# Patient Record
Sex: MALE | Race: WHITE | NOT HISPANIC OR LATINO | URBAN - METROPOLITAN AREA
[De-identification: names, ages, dates, MRNs, and addresses within clinical notes are randomized per-mention and may not be internally consistent; named-entity substitution may affect disease eponyms.]

---

## 2017-03-16 ENCOUNTER — IMPORTED ENCOUNTER (OUTPATIENT)
Dept: URBAN - METROPOLITAN AREA CLINIC 50 | Facility: CLINIC | Age: 77
End: 2017-03-16

## 2017-03-16 NOTE — PATIENT DISCUSSION
"""Dr. Denise Esparza to review test results with patient. "" ""Reassured patient that intraocular pressures (IOPs) are at target levels and other ocular findings are stable. Continue present management and/or medication(s).  Follow up as directed. """

## 2017-09-28 ENCOUNTER — IMPORTED ENCOUNTER (OUTPATIENT)
Dept: URBAN - METROPOLITAN AREA CLINIC 50 | Facility: CLINIC | Age: 77
End: 2017-09-28

## 2017-10-05 ENCOUNTER — IMPORTED ENCOUNTER (OUTPATIENT)
Dept: URBAN - METROPOLITAN AREA CLINIC 50 | Facility: CLINIC | Age: 77
End: 2017-10-05

## 2017-10-12 ENCOUNTER — IMPORTED ENCOUNTER (OUTPATIENT)
Dept: URBAN - METROPOLITAN AREA CLINIC 50 | Facility: CLINIC | Age: 77
End: 2017-10-12

## 2017-11-02 ENCOUNTER — IMPORTED ENCOUNTER (OUTPATIENT)
Dept: URBAN - METROPOLITAN AREA CLINIC 50 | Facility: CLINIC | Age: 77
End: 2017-11-02

## 2018-03-29 ENCOUNTER — IMPORTED ENCOUNTER (OUTPATIENT)
Dept: URBAN - METROPOLITAN AREA CLINIC 50 | Facility: CLINIC | Age: 78
End: 2018-03-29

## 2018-03-29 NOTE — PATIENT DISCUSSION
""" to review testing results with patient. IOP too high. Target range is 14 to 16. "" ""Start Combigan right eye twice a day. sample given"" ""Start Lumigan right eye at bedtime. sample given"" ""Continue Timolol 0.5% left eye twice a day.  """

## 2018-04-17 ENCOUNTER — IMPORTED ENCOUNTER (OUTPATIENT)
Dept: URBAN - METROPOLITAN AREA CLINIC 50 | Facility: CLINIC | Age: 78
End: 2018-04-17

## 2018-04-18 ENCOUNTER — IMPORTED ENCOUNTER (OUTPATIENT)
Dept: URBAN - METROPOLITAN AREA CLINIC 50 | Facility: CLINIC | Age: 78
End: 2018-04-18

## 2018-06-07 ENCOUNTER — IMPORTED ENCOUNTER (OUTPATIENT)
Dept: URBAN - METROPOLITAN AREA CLINIC 50 | Facility: CLINIC | Age: 78
End: 2018-06-07

## 2018-07-24 ENCOUNTER — IMPORTED ENCOUNTER (OUTPATIENT)
Dept: URBAN - METROPOLITAN AREA CLINIC 50 | Facility: CLINIC | Age: 78
End: 2018-07-24

## 2018-07-24 NOTE — PATIENT DISCUSSION
"""Continue Combigan right eye twice a day.  sample given"" ""Continue Lumigan right eye at bedtime. """

## 2018-07-24 NOTE — PATIENT DISCUSSION
"""Start Ilevro right eye twice a day. sample given"" ""Continue Artificial tears both eyes two - four times a day.  """

## 2018-10-04 ENCOUNTER — IMPORTED ENCOUNTER (OUTPATIENT)
Dept: URBAN - METROPOLITAN AREA CLINIC 50 | Facility: CLINIC | Age: 78
End: 2018-10-04

## 2018-10-04 NOTE — PATIENT DISCUSSION
"""Continue Timolol 0.5% left eye twice a day ."" ""Reassured patient that intraocular pressures (IOPs) are at target levels and other ocular findings are stable. Continue present management and/or medication(s).  Follow up as directed. """

## 2018-10-04 NOTE — PATIENT DISCUSSION
"""Refer to Dr. 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 ""

## 2018-10-05 ENCOUNTER — IMPORTED ENCOUNTER (OUTPATIENT)
Dept: URBAN - METROPOLITAN AREA CLINIC 50 | Facility: CLINIC | Age: 78
End: 2018-10-05

## 2018-10-18 ENCOUNTER — IMPORTED ENCOUNTER (OUTPATIENT)
Dept: URBAN - METROPOLITAN AREA CLINIC 50 | Facility: CLINIC | Age: 78
End: 2018-10-18

## 2018-10-23 ENCOUNTER — IMPORTED ENCOUNTER (OUTPATIENT)
Dept: URBAN - METROPOLITAN AREA CLINIC 50 | Facility: CLINIC | Age: 78
End: 2018-10-23

## 2018-11-01 ENCOUNTER — IMPORTED ENCOUNTER (OUTPATIENT)
Dept: URBAN - METROPOLITAN AREA CLINIC 50 | Facility: CLINIC | Age: 78
End: 2018-11-01

## 2018-11-12 ENCOUNTER — IMPORTED ENCOUNTER (OUTPATIENT)
Dept: URBAN - METROPOLITAN AREA CLINIC 50 | Facility: CLINIC | Age: 78
End: 2018-11-12

## 2018-11-26 ENCOUNTER — IMPORTED ENCOUNTER (OUTPATIENT)
Dept: URBAN - METROPOLITAN AREA CLINIC 50 | Facility: CLINIC | Age: 78
End: 2018-11-26

## 2018-12-17 ENCOUNTER — IMPORTED ENCOUNTER (OUTPATIENT)
Dept: URBAN - METROPOLITAN AREA CLINIC 50 | Facility: CLINIC | Age: 78
End: 2018-12-17

## 2018-12-26 ENCOUNTER — IMPORTED ENCOUNTER (OUTPATIENT)
Dept: URBAN - METROPOLITAN AREA CLINIC 50 | Facility: CLINIC | Age: 78
End: 2018-12-26

## 2018-12-27 ENCOUNTER — IMPORTED ENCOUNTER (OUTPATIENT)
Dept: URBAN - METROPOLITAN AREA CLINIC 50 | Facility: CLINIC | Age: 78
End: 2018-12-27

## 2018-12-31 ENCOUNTER — IMPORTED ENCOUNTER (OUTPATIENT)
Dept: URBAN - METROPOLITAN AREA CLINIC 50 | Facility: CLINIC | Age: 78
End: 2018-12-31

## 2019-02-12 ENCOUNTER — IMPORTED ENCOUNTER (OUTPATIENT)
Dept: URBAN - METROPOLITAN AREA CLINIC 50 | Facility: CLINIC | Age: 79
End: 2019-02-12

## 2019-02-13 ENCOUNTER — IMPORTED ENCOUNTER (OUTPATIENT)
Dept: URBAN - METROPOLITAN AREA CLINIC 50 | Facility: CLINIC | Age: 79
End: 2019-02-13

## 2019-02-19 ENCOUNTER — IMPORTED ENCOUNTER (OUTPATIENT)
Dept: URBAN - METROPOLITAN AREA CLINIC 50 | Facility: CLINIC | Age: 79
End: 2019-02-19

## 2019-03-04 ENCOUNTER — IMPORTED ENCOUNTER (OUTPATIENT)
Dept: URBAN - METROPOLITAN AREA CLINIC 50 | Facility: CLINIC | Age: 79
End: 2019-03-04

## 2019-03-05 ENCOUNTER — IMPORTED ENCOUNTER (OUTPATIENT)
Dept: URBAN - METROPOLITAN AREA CLINIC 50 | Facility: CLINIC | Age: 79
End: 2019-03-05

## 2019-03-15 ENCOUNTER — IMPORTED ENCOUNTER (OUTPATIENT)
Dept: URBAN - METROPOLITAN AREA CLINIC 50 | Facility: CLINIC | Age: 79
End: 2019-03-15

## 2019-04-09 ENCOUNTER — IMPORTED ENCOUNTER (OUTPATIENT)
Dept: URBAN - METROPOLITAN AREA CLINIC 50 | Facility: CLINIC | Age: 79
End: 2019-04-09

## 2019-04-11 ENCOUNTER — IMPORTED ENCOUNTER (OUTPATIENT)
Dept: URBAN - METROPOLITAN AREA CLINIC 50 | Facility: CLINIC | Age: 79
End: 2019-04-11

## 2019-04-25 ENCOUNTER — IMPORTED ENCOUNTER (OUTPATIENT)
Dept: URBAN - METROPOLITAN AREA CLINIC 50 | Facility: CLINIC | Age: 79
End: 2019-04-25

## 2019-08-22 ENCOUNTER — IMPORTED ENCOUNTER (OUTPATIENT)
Dept: URBAN - METROPOLITAN AREA CLINIC 50 | Facility: CLINIC | Age: 79
End: 2019-08-22

## 2019-10-24 ENCOUNTER — IMPORTED ENCOUNTER (OUTPATIENT)
Dept: URBAN - METROPOLITAN AREA CLINIC 50 | Facility: CLINIC | Age: 79
End: 2019-10-24

## 2020-03-09 ENCOUNTER — IMPORTED ENCOUNTER (OUTPATIENT)
Dept: URBAN - METROPOLITAN AREA CLINIC 50 | Facility: CLINIC | Age: 80
End: 2020-03-09

## 2020-04-30 ENCOUNTER — IMPORTED ENCOUNTER (OUTPATIENT)
Dept: URBAN - METROPOLITAN AREA CLINIC 50 | Facility: CLINIC | Age: 80
End: 2020-04-30

## 2020-05-06 NOTE — PATIENT DISCUSSION
NAMITA OU:  PRESCRIBED UV PROTECTION TO SLOW GROWTH. PRESCRIBE ARTIFICAL TEARS TO INCREASE COMFORT. Detail Level: Simple Instructions: This plan will send the code FBSD to the PM system.  DO NOT or CHANGE the price. Price (Do Not Change): 0.00

## 2020-07-27 ENCOUNTER — IMPORTED ENCOUNTER (OUTPATIENT)
Dept: URBAN - METROPOLITAN AREA CLINIC 50 | Facility: CLINIC | Age: 80
End: 2020-07-27

## 2020-10-29 ENCOUNTER — IMPORTED ENCOUNTER (OUTPATIENT)
Dept: URBAN - METROPOLITAN AREA CLINIC 50 | Facility: CLINIC | Age: 80
End: 2020-10-29

## 2020-11-11 ENCOUNTER — IMPORTED ENCOUNTER (OUTPATIENT)
Dept: URBAN - METROPOLITAN AREA CLINIC 50 | Facility: CLINIC | Age: 80
End: 2020-11-11

## 2020-11-18 ENCOUNTER — IMPORTED ENCOUNTER (OUTPATIENT)
Dept: URBAN - METROPOLITAN AREA CLINIC 50 | Facility: CLINIC | Age: 80
End: 2020-11-18

## 2020-11-30 ENCOUNTER — IMPORTED ENCOUNTER (OUTPATIENT)
Dept: URBAN - METROPOLITAN AREA CLINIC 50 | Facility: CLINIC | Age: 80
End: 2020-11-30

## 2020-12-01 ENCOUNTER — IMPORTED ENCOUNTER (OUTPATIENT)
Dept: URBAN - METROPOLITAN AREA CLINIC 50 | Facility: CLINIC | Age: 80
End: 2020-12-01

## 2020-12-03 NOTE — PATIENT DISCUSSION
RE-EVAL DR ARCEO/ADRIEL 2-3 WKS FOR IOP AND EDEMA - IF NO IMPROVEMENT REEVAL DR PACK OTHERWISE CONTINUE AND TRY AND TAPER ONCE EDMEA CLEARS.

## 2020-12-09 ENCOUNTER — IMPORTED ENCOUNTER (OUTPATIENT)
Dept: URBAN - METROPOLITAN AREA CLINIC 50 | Facility: CLINIC | Age: 80
End: 2020-12-09

## 2021-01-06 ENCOUNTER — IMPORTED ENCOUNTER (OUTPATIENT)
Dept: URBAN - METROPOLITAN AREA CLINIC 50 | Facility: CLINIC | Age: 81
End: 2021-01-06

## 2021-03-11 ENCOUNTER — IMPORTED ENCOUNTER (OUTPATIENT)
Dept: URBAN - METROPOLITAN AREA CLINIC 50 | Facility: CLINIC | Age: 81
End: 2021-03-11

## 2021-04-18 ASSESSMENT — VISUAL ACUITY
OS_SC: 20/20
OS_SC: 20/25
OS_CC: J1+
OD_SC: 20/50
OS_SC: 20/25
OD_SC: 20/50
OD_SC: 20/25-3
OS_SC: 20/25-2
OS_SC: 20/20-
OS_SC: 20/20
OD_SC: 20/30-+
OS_CC: J1+
OD_SC: 20/80
OD_CC: J1+
OD_PH: 20/25-1
OS_SC: 20/20
OD_PH: 20/30-1
OD_SC: 20/25-
OD_SC: 20/30-
OD_PH: 20/30+
OS_SC: 20/20-1
OS_SC: 20/25-1
OD_CC: J1+
OS_SC: 20/20
OS_SC: 20/20
OS_CC: J1+
OD_PH: 20/50+2
OS_SC: 20/25
OD_SC: 20/25-2
OD_SC: 20/40
OD_SC: 20/60+2
OS_CC: J1+
OD_CC: J1+
OD_SC: 20/50
OS_SC: 20/25-1
OD_SC: 20/30+2
OS_SC: 20/20
OD_SC: 20/25-
OD_SC: 20/60
OD_PH: 20/25
OS_SC: 20/25-
OS_SC: 20/30-1
OS_SC: 20/20
OD_SC: 20/25-
OD_SC: 20/30
OD_SC: 20/30+
OS_SC: 20/20
OD_SC: 20/20
OS_SC: 20/20
OS_SC: 20/25
OS_SC: 20/25
OS_SC: 20/20
OD_SC: 20/30-
OS_SC: 20/20
OD_SC: 20/25-1
OD_SC: 20/25-2
OS_SC: 20/30-2
OD_SC: 20/30+1
OD_SC: 20/40
OD_PH: 20/40
OD_SC: 20/25-2
OD_SC: 20/25
OS_SC: 20/20
OD_SC: 20/50
OD_PH: 20/40
OS_SC: 20/20-1
OD_SC: 20/30-1
OS_SC: 20/20-
OD_CC: J1+
OD_PH: 20/30
OS_SC: 20/20
OS_SC: 20/20-
OD_PH: 20/30
OD_CC: J1+
OS_CC: J1+
OD_PH: 20/25
OD_PH: 20/30+
OD_SC: 20/40+3
OD_SC: 20/50

## 2021-04-18 ASSESSMENT — PACHYMETRY
OD_CT_UM: 540
OS_CT_UM: 543
OD_CT_UM: 540
OD_CT_UM: 540
OS_CT_UM: 543
OD_CT_UM: 540
OS_CT_UM: 543
OS_CT_UM: 553
OS_CT_UM: 553
OS_CT_UM: 543
OD_CT_UM: 540
OS_CT_UM: 543
OD_CT_UM: 540
OD_CT_UM: 552
OD_CT_UM: 540
OD_CT_UM: 552
OS_CT_UM: 543
OD_CT_UM: 540
OS_CT_UM: 553
OS_CT_UM: 543
OD_CT_UM: 540
OS_CT_UM: 543
OD_CT_UM: 540
OS_CT_UM: 543
OS_CT_UM: 553
OS_CT_UM: 543
OS_CT_UM: 543
OD_CT_UM: 540
OD_CT_UM: 552
OS_CT_UM: 543
OD_CT_UM: 540
OS_CT_UM: 543
OD_CT_UM: 552
OD_CT_UM: 540
OD_CT_UM: 552
OS_CT_UM: 553
OD_CT_UM: 540
OD_CT_UM: 540
OD_CT_UM: 552
OS_CT_UM: 553
OS_CT_UM: 543
OD_CT_UM: 540
OD_CT_UM: 540
OS_CT_UM: 543
OS_CT_UM: 543
OD_CT_UM: 552
OD_CT_UM: 540
OD_CT_UM: 552
OS_CT_UM: 553
OS_CT_UM: 543
OD_CT_UM: 552
OD_CT_UM: 552
OS_CT_UM: 543
OS_CT_UM: 543
OD_CT_UM: 540
OS_CT_UM: 543
OS_CT_UM: 553
OD_CT_UM: 540
OD_CT_UM: 540
OS_CT_UM: 543
OS_CT_UM: 553
OD_CT_UM: 540
OD_CT_UM: 552
OS_CT_UM: 553
OS_CT_UM: 553
OD_CT_UM: 540

## 2021-04-18 ASSESSMENT — TONOMETRY
OD_IOP_MMHG: 17
OS_IOP_MMHG: 22
OS_IOP_MMHG: 16
OS_IOP_MMHG: 11
OS_IOP_MMHG: 14
OS_IOP_MMHG: 20
OS_IOP_MMHG: 19
OD_IOP_MMHG: 14
OD_IOP_MMHG: 15
OD_IOP_MMHG: 24
OS_IOP_MMHG: 15
OS_IOP_MMHG: 15
OD_IOP_MMHG: 15
OS_IOP_MMHG: 15
OD_IOP_MMHG: 08
OS_IOP_MMHG: 16
OD_IOP_MMHG: 17
OD_IOP_MMHG: 19
OD_IOP_MMHG: 12
OS_IOP_MMHG: 16
OS_IOP_MMHG: 15
OS_IOP_MMHG: 15
OS_IOP_MMHG: 16
OD_IOP_MMHG: 14
OD_IOP_MMHG: 13
OD_IOP_MMHG: 20
OS_IOP_MMHG: 20
OS_IOP_MMHG: 18
OS_IOP_MMHG: 14
OS_IOP_MMHG: 15
OS_IOP_MMHG: 22
OD_IOP_MMHG: 14
OS_IOP_MMHG: 18
OS_IOP_MMHG: 16
OS_IOP_MMHG: 13
OD_IOP_MMHG: 10
OS_IOP_MMHG: 18
OD_IOP_MMHG: 15
OD_IOP_MMHG: 11
OS_IOP_MMHG: 14
OD_IOP_MMHG: 14
OS_IOP_MMHG: 18
OS_IOP_MMHG: 21
OS_IOP_MMHG: 13
OD_IOP_MMHG: 16
OD_IOP_MMHG: 14
OS_IOP_MMHG: 15
OS_IOP_MMHG: 13
OD_IOP_MMHG: 18
OD_IOP_MMHG: 8
OD_IOP_MMHG: 18
OD_IOP_MMHG: 14
OS_IOP_MMHG: 14
OD_IOP_MMHG: 14
OS_IOP_MMHG: 14
OD_IOP_MMHG: 18
OD_IOP_MMHG: 10
OD_IOP_MMHG: 13
OD_IOP_MMHG: 13
OD_IOP_MMHG: 16
OD_IOP_MMHG: 18
OS_IOP_MMHG: 14
OD_IOP_MMHG: 16
OD_IOP_MMHG: 11

## 2021-07-07 ENCOUNTER — PREPPED CHART (OUTPATIENT)
Dept: URBAN - METROPOLITAN AREA CLINIC 49 | Facility: CLINIC | Age: 81
End: 2021-07-07

## 2021-07-07 NOTE — PATIENT DISCUSSION
Severe Primary Open Angle Glaucoma, bilateral. IOPs stable. Continue Combigan both eyes twice a day.

## 2021-07-08 ENCOUNTER — FOLLOW UP (OUTPATIENT)
Dept: URBAN - METROPOLITAN AREA CLINIC 49 | Facility: CLINIC | Age: 81
End: 2021-07-08

## 2021-07-08 DIAGNOSIS — H40.1133: ICD-10-CM

## 2021-07-08 PROCEDURE — 92012 INTRM OPH EXAM EST PATIENT: CPT

## 2021-07-08 ASSESSMENT — VISUAL ACUITY
OD_SC: 20/25
OS_SC: 20/25
OU_CC: J1+

## 2021-07-08 ASSESSMENT — TONOMETRY
OS_IOP_MMHG: 13
OD_IOP_MMHG: 12
OD_IOP_MMHG: 12
OS_IOP_MMHG: 13

## 2022-01-26 ENCOUNTER — COMPREHENSIVE EXAM (OUTPATIENT)
Dept: URBAN - METROPOLITAN AREA CLINIC 49 | Facility: CLINIC | Age: 82
End: 2022-01-26

## 2022-01-26 DIAGNOSIS — H40.1133: ICD-10-CM

## 2022-01-26 PROCEDURE — 92083 EXTENDED VISUAL FIELD XM: CPT

## 2022-01-26 PROCEDURE — 92133 CPTRZD OPH DX IMG PST SGM ON: CPT

## 2022-01-26 PROCEDURE — 92014 COMPRE OPH EXAM EST PT 1/>: CPT

## 2022-01-26 RX ORDER — LATANOPROST 50 UG/ML: 1 SOLUTION/ DROPS OPHTHALMIC EVERY EVENING

## 2022-01-26 ASSESSMENT — VISUAL ACUITY
OS_GLARE: 20/30
OU_SC: 20/20
OD_SC: 20/25-2
OU_CC: J1+
OS_SC: 20/25-2
OD_CC: J1+
OD_GLARE: 20/30
OS_CC: J1+

## 2022-01-26 ASSESSMENT — TONOMETRY
OD_IOP_MMHG: 13
OS_IOP_MMHG: 14

## 2022-01-26 NOTE — PATIENT DISCUSSION
Severe Primary Open Angle Glaucoma, bilateral. IOPs stable.  But hvf slight worse od today.  Continue Combigan both eyes twice a day. start latanoprost at bedtime right eye only.   hvf/oct rnfl done today and reviewed with patient.

## 2022-02-09 ENCOUNTER — FOLLOW UP (OUTPATIENT)
Dept: URBAN - METROPOLITAN AREA CLINIC 49 | Facility: CLINIC | Age: 82
End: 2022-02-09

## 2022-02-09 DIAGNOSIS — H40.1133: ICD-10-CM

## 2022-02-09 PROCEDURE — 92012 INTRM OPH EXAM EST PATIENT: CPT

## 2022-02-09 ASSESSMENT — TONOMETRY
OD_IOP_MMHG: 07
OS_IOP_MMHG: 09

## 2022-02-09 ASSESSMENT — VISUAL ACUITY
OS_SC: 20/25
OD_SC: 20/25

## 2022-02-09 NOTE — PATIENT DISCUSSION
Decreased IOP OD since starting additional drop therapy OD at last visit. IOP stable and at target OU. Continue current Combigan OU and Latanoprost OD. Follow up in 6 months for IOP check.

## 2022-09-21 ENCOUNTER — FOLLOW UP (OUTPATIENT)
Dept: URBAN - METROPOLITAN AREA CLINIC 49 | Facility: CLINIC | Age: 82
End: 2022-09-21

## 2022-09-21 DIAGNOSIS — H40.1133: ICD-10-CM

## 2022-09-21 PROCEDURE — 92012 INTRM OPH EXAM EST PATIENT: CPT

## 2022-09-21 ASSESSMENT — VISUAL ACUITY
OS_SC: 20/25+2
OD_SC: 20/25-2

## 2022-09-21 ASSESSMENT — TONOMETRY
OS_IOP_MMHG: 15
OD_IOP_MMHG: 14

## 2022-09-21 NOTE — PATIENT DISCUSSION
IOP 14/15 at todays visit. Want to lower patients pressure even lower. Patient to now increase Latanoprost to QHS OU, and continue Combigan BID OU.

## 2022-10-12 ENCOUNTER — CLINIC PROCEDURE ONLY (OUTPATIENT)
Dept: URBAN - METROPOLITAN AREA CLINIC 49 | Facility: CLINIC | Age: 82
End: 2022-10-12

## 2022-10-12 DIAGNOSIS — H40.1133: ICD-10-CM

## 2022-10-12 PROCEDURE — 65855 TRABECULOPLASTY LASER SURG: CPT

## 2022-10-12 ASSESSMENT — VISUAL ACUITY
OS_SC: 20/30
OD_SC: 20/30

## 2022-10-12 ASSESSMENT — TONOMETRY
OS_IOP_MMHG: 14
OS_IOP_MMHG: 14
OD_IOP_MMHG: 12
OD_IOP_MMHG: 12

## 2022-10-12 NOTE — PROCEDURE NOTE: CLINICAL
PROCEDURE NOTE: SLT OD. Diagnosis: POAG, Severe. Anesthesia: Topical. Prior to treatment, the risks/benefits/alternatives were discussed. The patient wished to proceed with procedure. Alphagan, Proparacaine and Pilocarpine given pre laser. The patient was seated at the laser and the Selective Laser Trabecculoplasty (SLT) gonio lens was placed on the ocular surface with protective lubricant to protect the ocular surface. Power: 1.3mJ. Pulses: 80.  Patient tolerated procedure well. There were no complications. Post Laser IOP: *. Post procedure instructions given. Meliza Jordan

## 2022-10-12 NOTE — PATIENT DISCUSSION
SLT preformed today in office with patient written consent. RTC as scheduled for SLT OS. Continue Combigan BID OU. Continue Latanoprost OU qhs. IOP today in office 12/14 .

## 2022-10-24 ENCOUNTER — CLINIC PROCEDURE ONLY (OUTPATIENT)
Dept: URBAN - METROPOLITAN AREA CLINIC 52 | Facility: CLINIC | Age: 82
End: 2022-10-24

## 2022-10-24 DIAGNOSIS — H40.1133: ICD-10-CM

## 2022-10-24 PROCEDURE — 65855 TRABECULOPLASTY LASER SURG: CPT

## 2022-10-24 ASSESSMENT — VISUAL ACUITY
OS_SC: 20/25
OD_SC: 20/30

## 2022-10-24 ASSESSMENT — TONOMETRY
OS_IOP_MMHG: 11
OS_IOP_MMHG: 14
OD_IOP_MMHG: 10

## 2022-10-24 NOTE — PROCEDURE NOTE: CLINICAL
PROCEDURE NOTE: SLT OS. Diagnosis: POAG, Severe. Anesthesia: Topical. Prior to treatment, the risks/benefits/alternatives were discussed. The patient wished to proceed with procedure. Alphagan, Proparacaine and Pilocarpine given pre laser. The patient was seated at the laser and the Selective Laser Trabecculoplasty (SLT) gonio lens was placed on the ocular surface with protective lubricant to protect the ocular surface. Power: 1.2mJ. Pulses: 77.  Patient tolerated procedure well. There were no complications. Post Laser IOP: 14. Post procedure instructions given. Joel Preciado

## 2022-10-24 NOTE — PATIENT DISCUSSION
SLT preformed today in office with patient written consent. Continue Latanoprost OU qhs. IOP post laser was 14 today in office. Patient to return in 2 months for IOP check.

## 2022-12-28 ENCOUNTER — FOLLOW UP (OUTPATIENT)
Dept: URBAN - METROPOLITAN AREA CLINIC 49 | Facility: CLINIC | Age: 82
End: 2022-12-28

## 2022-12-28 PROCEDURE — 92012 INTRM OPH EXAM EST PATIENT: CPT

## 2022-12-28 ASSESSMENT — TONOMETRY
OS_IOP_MMHG: 10
OD_IOP_MMHG: 10
OD_IOP_MMHG: 10
OS_IOP_MMHG: 10

## 2022-12-28 ASSESSMENT — VISUAL ACUITY
OD_SC: 20/40-1/+2
OD_PH: 20/25-2
OS_SC: 20/25-1/+2

## 2022-12-28 NOTE — PATIENT DISCUSSION
S/P SLT OU. IOP today improved 10/10 adjust to 10/10. Continue Latanoprost OU qhs and Combigan OU BID. RTC in 4 months for comprehensive exam with HVF/RNFL.

## 2023-04-11 ENCOUNTER — COMPREHENSIVE EXAM (OUTPATIENT)
Dept: URBAN - METROPOLITAN AREA CLINIC 53 | Facility: CLINIC | Age: 83
End: 2023-04-11

## 2023-04-11 DIAGNOSIS — H43.813: ICD-10-CM

## 2023-04-11 DIAGNOSIS — H17.9: ICD-10-CM

## 2023-04-11 DIAGNOSIS — H04.123: ICD-10-CM

## 2023-04-11 DIAGNOSIS — H31.093: ICD-10-CM

## 2023-04-11 DIAGNOSIS — H40.1133: ICD-10-CM

## 2023-04-11 DIAGNOSIS — H35.363: ICD-10-CM

## 2023-04-11 PROCEDURE — 92083 EXTENDED VISUAL FIELD XM: CPT

## 2023-04-11 PROCEDURE — 92014 COMPRE OPH EXAM EST PT 1/>: CPT

## 2023-04-11 PROCEDURE — 92133 CPTRZD OPH DX IMG PST SGM ON: CPT

## 2023-04-11 RX ORDER — POLYETHYLENE GLYCOL 400 2.5 MG/ML
1 SOLUTION/ DROPS OPHTHALMIC
Start: 2023-04-11

## 2023-04-11 ASSESSMENT — TONOMETRY
OS_IOP_MMHG: 10
OD_IOP_MMHG: 11

## 2023-04-11 ASSESSMENT — VISUAL ACUITY
OD_GLARE: 20/40
OU_CC: J1+
OD_PH: 20/30
OS_PH: 20/25
OD_GLARE: 20/30
OS_GLARE: 20/40
OS_GLARE: 20/40
OD_SC: 20/50
OS_SC: 20/30+2

## 2023-10-03 ENCOUNTER — FOLLOW UP (OUTPATIENT)
Dept: URBAN - METROPOLITAN AREA CLINIC 53 | Facility: CLINIC | Age: 83
End: 2023-10-03

## 2023-10-03 DIAGNOSIS — H40.1133: ICD-10-CM

## 2023-10-03 DIAGNOSIS — H04.123: ICD-10-CM

## 2023-10-03 PROCEDURE — 92015 DETERMINE REFRACTIVE STATE: CPT

## 2023-10-03 PROCEDURE — 99213 OFFICE O/P EST LOW 20 MIN: CPT

## 2023-10-03 ASSESSMENT — VISUAL ACUITY
OS_SC: 20/25
OD_PH: 20/30-1
OD_SC: 20/60

## 2023-10-03 ASSESSMENT — TONOMETRY
OD_IOP_MMHG: 10
OS_IOP_MMHG: 12

## 2023-10-18 ENCOUNTER — CONTACT LENSES/GLASSES VISIT (OUTPATIENT)
Dept: URBAN - METROPOLITAN AREA CLINIC 49 | Facility: LOCATION | Age: 83
End: 2023-10-18

## 2023-10-18 DIAGNOSIS — H52.7: ICD-10-CM

## 2023-10-18 PROCEDURE — 92015 DETERMINE REFRACTIVE STATE: CPT | Mod: NC

## 2023-10-18 PROCEDURE — 92134 CPTRZ OPH DX IMG PST SGM RTA: CPT | Mod: NC

## 2023-10-18 ASSESSMENT — VISUAL ACUITY
OD_SC: 20/60
OS_SC: 20/25

## 2024-04-02 ENCOUNTER — COMPREHENSIVE EXAM (OUTPATIENT)
Dept: URBAN - METROPOLITAN AREA CLINIC 53 | Facility: CLINIC | Age: 84
End: 2024-04-02

## 2024-04-02 DIAGNOSIS — H52.4: ICD-10-CM

## 2024-04-02 DIAGNOSIS — H40.1133: ICD-10-CM

## 2024-04-02 DIAGNOSIS — H31.093: ICD-10-CM

## 2024-04-02 DIAGNOSIS — H17.9: ICD-10-CM

## 2024-04-02 DIAGNOSIS — H43.813: ICD-10-CM

## 2024-04-02 DIAGNOSIS — H35.363: ICD-10-CM

## 2024-04-02 PROCEDURE — 92015 DETERMINE REFRACTIVE STATE: CPT

## 2024-04-02 PROCEDURE — 92083 EXTENDED VISUAL FIELD XM: CPT

## 2024-04-02 PROCEDURE — 99214 OFFICE O/P EST MOD 30 MIN: CPT

## 2024-04-02 PROCEDURE — 92133 CPTRZD OPH DX IMG PST SGM ON: CPT

## 2024-04-02 RX ORDER — LATANOPROSTENE BUNOD 0.24 MG/ML
1 SOLUTION/ DROPS OPHTHALMIC EVERY EVENING
Start: 2024-04-02

## 2024-04-02 RX ORDER — BRINZOLAMIDE/BRIMONIDINE TARTRATE 10; 2 MG/ML; MG/ML
1 SUSPENSION/ DROPS OPHTHALMIC TWICE A DAY
Start: 2024-04-02

## 2024-04-02 ASSESSMENT — VISUAL ACUITY
OS_GLARE: 20/25
OU_SC: J7@16"
OS_GLARE: 20/25
OD_GLARE: 20/50
OD_GLARE: 20/50
OD_SC: 20/100
OU_SC: 20/30
OD_PH: 20/40
OU_CC: J1+@16"
OS_SC: 20/30

## 2024-04-02 ASSESSMENT — TONOMETRY
OD_IOP_MMHG: 11
OS_IOP_MMHG: 11

## 2024-04-16 ENCOUNTER — FOLLOW UP (OUTPATIENT)
Dept: URBAN - METROPOLITAN AREA CLINIC 53 | Facility: CLINIC | Age: 84
End: 2024-04-16

## 2024-04-16 DIAGNOSIS — H40.1133: ICD-10-CM

## 2024-04-16 DIAGNOSIS — H04.123: ICD-10-CM

## 2024-04-16 PROCEDURE — 99213 OFFICE O/P EST LOW 20 MIN: CPT

## 2024-04-16 ASSESSMENT — VISUAL ACUITY
OU_SC: 20/25
OD_SC: 20/60-2
OD_PH: 20/40
OS_SC: 20/30-2

## 2024-04-16 ASSESSMENT — TONOMETRY
OS_IOP_MMHG: 11
OD_IOP_MMHG: 11
OD_IOP_MMHG: 11
OS_IOP_MMHG: 11

## 2024-10-15 ENCOUNTER — FOLLOW UP (OUTPATIENT)
Dept: URBAN - METROPOLITAN AREA CLINIC 53 | Facility: CLINIC | Age: 84
End: 2024-10-15

## 2024-10-15 DIAGNOSIS — H40.1133: ICD-10-CM

## 2024-10-15 PROCEDURE — 99213 OFFICE O/P EST LOW 20 MIN: CPT

## 2024-10-15 RX ORDER — LATANOPROST 50 UG/ML
1 SOLUTION/ DROPS OPHTHALMIC EVERY EVENING
Start: 2024-10-15

## 2024-10-15 RX ORDER — BRIMONIDINE TARTRATE, TIMOLOL MALEATE 2; 5 MG/ML; MG/ML
1 SOLUTION/ DROPS OPHTHALMIC TWICE A DAY
Start: 2024-10-15

## 2024-11-12 ENCOUNTER — FOLLOW UP (OUTPATIENT)
Dept: URBAN - METROPOLITAN AREA CLINIC 53 | Facility: CLINIC | Age: 84
End: 2024-11-12

## 2024-11-12 DIAGNOSIS — H10.13: ICD-10-CM

## 2024-11-12 DIAGNOSIS — H40.1133: ICD-10-CM

## 2024-11-12 PROCEDURE — 99213 OFFICE O/P EST LOW 20 MIN: CPT

## 2024-11-12 RX ORDER — LOTEPREDNOL ETABONATE 3.8 MG/G
1 GEL OPHTHALMIC ONCE A DAY
Start: 2024-11-12

## 2024-11-12 RX ORDER — TAFLUPROST 0 MG/.3ML
1 SOLUTION/ DROPS OPHTHALMIC EVERY EVENING
Start: 2024-11-12

## 2024-11-25 ENCOUNTER — FOLLOW UP (OUTPATIENT)
Dept: URBAN - METROPOLITAN AREA CLINIC 52 | Facility: CLINIC | Age: 84
End: 2024-11-25

## 2024-11-25 DIAGNOSIS — H40.1133: ICD-10-CM

## 2024-11-25 DIAGNOSIS — H10.13: ICD-10-CM

## 2024-11-25 PROCEDURE — 99213 OFFICE O/P EST LOW 20 MIN: CPT

## 2025-05-06 ENCOUNTER — COMPREHENSIVE EXAM (OUTPATIENT)
Age: 85
End: 2025-05-06

## 2025-05-06 DIAGNOSIS — H17.9: ICD-10-CM

## 2025-05-06 DIAGNOSIS — H35.363: ICD-10-CM

## 2025-05-06 DIAGNOSIS — H31.093: ICD-10-CM

## 2025-05-06 DIAGNOSIS — H43.813: ICD-10-CM

## 2025-05-06 DIAGNOSIS — H04.123: ICD-10-CM

## 2025-05-06 DIAGNOSIS — H40.1133: ICD-10-CM

## 2025-05-06 DIAGNOSIS — H52.4: ICD-10-CM

## 2025-05-06 PROCEDURE — 99214 OFFICE O/P EST MOD 30 MIN: CPT

## 2025-05-06 PROCEDURE — 92083 EXTENDED VISUAL FIELD XM: CPT

## 2025-05-06 PROCEDURE — 92015 DETERMINE REFRACTIVE STATE: CPT

## 2025-05-06 PROCEDURE — 92133 CPTRZD OPH DX IMG PST SGM ON: CPT
